# Patient Record
Sex: MALE | Race: WHITE | NOT HISPANIC OR LATINO | URBAN - NONMETROPOLITAN AREA
[De-identification: names, ages, dates, MRNs, and addresses within clinical notes are randomized per-mention and may not be internally consistent; named-entity substitution may affect disease eponyms.]

---

## 2024-06-02 PROBLEM — R31.9 HEMATURIA: Status: ACTIVE | Noted: 2024-06-02

## 2024-06-03 ENCOUNTER — TELEPHONE (OUTPATIENT)
Dept: UROLOGY | Facility: CLINIC | Age: 79
End: 2024-06-03

## 2024-06-03 RX ORDER — FINASTERIDE 5 MG/1
5 TABLET, FILM COATED ORAL
COMMUNITY

## 2024-06-03 RX ORDER — WARFARIN SODIUM 5 MG/1
5 TABLET ORAL
COMMUNITY

## 2024-06-03 RX ORDER — PANTOPRAZOLE SODIUM 20 MG/1
40 TABLET, DELAYED RELEASE ORAL
COMMUNITY

## 2024-06-03 RX ORDER — TRAVOPROST OPHTHALMIC SOLUTION 0.04 MG/ML
1 SOLUTION OPHTHALMIC
COMMUNITY

## 2024-06-03 NOTE — TELEPHONE ENCOUNTER
Pt name was incorrect in chart. Updated name. Pt unsure if he had recent psa testing done. Advised will call back if anything further is needed. Fax sent to [facility] medical records for last office note.  Call placed to PCP office for recent labs. Per their office PSA 2.1

## 2024-06-03 NOTE — PROGRESS NOTES
UROLOGY PROGRESS NOTE         NAME: Chris Francisco  AGE: 79 y.o. SEX: male  : 1945   MRN: [id number]    DATE: 2024  TIME: 1:23 PM    Assessment and Plan   Procedures     Impression:   1. Hematuria, unspecified type  2. Benign prostatic hyperplasia with lower urinary tract symptoms, symptom details unspecified  3. Bladder wall thickening  4. History of gross hematuria  5. Gross hematuria       Plan: For the gross hematuria recommend CT urogram, and cystoscopy with me in the office to complete his workup.  Also send his urine for culture and cytology today.    Continue finasteride for BPH.    Discussed the potential concerns with the history of gross hematuria including bladder cancer, stones, BPH related, and anticoagulant effects with Coumadin.      Chief Complaint     Chief Complaint   Patient presents with    Gross Hematuria    Benign Prostatic Hypertrophy     History of Present Illness     HPI: Chris Francisco is a 79 y.o. year old male who presents with hematuria.  Awaiting more notes in [facility].  Apparently saw patient while at [facility].    Per PCP note 2024 patient on finasteride.  Also on Coumadin.  Patient had abdominal ultrasound for left upper quadrant pain.  Noted left kidney was normal.  No mention of right kidney.  Urinalysis 24 was normal, PSA 2.1  Patient had CT urogram on  hematuria found to have enlarged prostate with bladder wall thickening.  Few months back patient had gross hematuria couple episodes.    Voiding well, average flow, some incomplete emptying occasional postvoid dribbling.  No urgency frequency.  No dysuria.  Does not smoke or drink alcohol.  Is on Coumadin.  No GI complaints is diabetic now but on no meds.    PVR today was 0.  Urinalysis and microscopic blood.    The following portions of the patient's history were reviewed and updated as appropriate: allergies, current medications, past family history, past medical history, past social history, past surgical  history and problem list.  Past Medical History:   Diagnosis Date    DVT (deep venous thrombosis) (HCC)     Enlarged prostate     GERD (gastroesophageal reflux disease)     Sleep apnea      Past Surgical History:   Procedure Laterality Date    HERNIA REPAIR      REPLACEMENT TOTAL KNEE      SHOULDER SURGERY       shoulder  Review of Systems     Const: Denies chills, fever and weight loss.  CV: Denies chest pain.  Resp: Denies SOB.  GI: Denies abdominal pain, nausea and vomiting.  : Denies symptoms other than stated above.  Musculo: Denies back pain.    Objective   /60   Pulse 70   Temp 98 °F (36.7 °C)   Wt 97.8 kg (215 lb 9.6 oz)   SpO2 98%     Physical Exam  Const: Appears healthy and well developed. No signs of acute distress present.  Resp: Respirations are regular and unlabored.   CV: Rate is regular. Rhythm is regular.  Abdomen: Abdomen is soft, nontender, and nondistended. Kidneys are not palpable.  : Normal external genital exam prostate large smooth no masses or nodules  Psych: Patient's attitude is cooperative. Mood is normal. Affect is normal.    Current Medications     Current Outpatient Medications:     Diclofenac Sodium (Voltaren) 1 %, Apply 2 g topically 4 (four) times a day, Disp: , Rfl:     finasteride (PROSCAR) 5 mg tablet, Take 5 mg by mouth, Disp: , Rfl:     pantoprazole (PROTONIX) 20 mg tablet, Take 40 mg by mouth, Disp: , Rfl:     travoprost (TRAVATAN-Z) 0.004 % ophthalmic solution, Apply 1 drop to eye, Disp: , Rfl:     warfarin (COUMADIN) 5 mg tablet, Take 5 mg by mouth, Disp: , Rfl:         Calixto Bhardwaj MD

## 2024-06-12 ENCOUNTER — APPOINTMENT (OUTPATIENT)
Dept: LAB | Facility: HOSPITAL | Age: 79
End: 2024-06-12
Payer: COMMERCIAL

## 2024-06-12 ENCOUNTER — OFFICE VISIT (OUTPATIENT)
Dept: UROLOGY | Facility: CLINIC | Age: 79
End: 2024-06-12
Payer: COMMERCIAL

## 2024-06-12 VITALS
WEIGHT: 215.6 LBS | DIASTOLIC BLOOD PRESSURE: 60 MMHG | HEART RATE: 70 BPM | TEMPERATURE: 98 F | OXYGEN SATURATION: 98 % | SYSTOLIC BLOOD PRESSURE: 112 MMHG

## 2024-06-12 DIAGNOSIS — Z87.898 HISTORY OF GROSS HEMATURIA: ICD-10-CM

## 2024-06-12 DIAGNOSIS — R31.0 GROSS HEMATURIA: ICD-10-CM

## 2024-06-12 DIAGNOSIS — N32.89 BLADDER WALL THICKENING: ICD-10-CM

## 2024-06-12 DIAGNOSIS — R31.9 HEMATURIA, UNSPECIFIED TYPE: Primary | ICD-10-CM

## 2024-06-12 DIAGNOSIS — N40.1 BENIGN PROSTATIC HYPERPLASIA WITH LOWER URINARY TRACT SYMPTOMS, SYMPTOM DETAILS UNSPECIFIED: ICD-10-CM

## 2024-06-12 LAB
ANION GAP SERPL CALCULATED.3IONS-SCNC: 5 MMOL/L (ref 4–13)
BUN SERPL-MCNC: 16 MG/DL (ref 5–25)
CALCIUM SERPL-MCNC: 8.7 MG/DL (ref 8.4–10.2)
CHLORIDE SERPL-SCNC: 108 MMOL/L (ref 96–108)
CO2 SERPL-SCNC: 26 MMOL/L (ref 21–32)
CREAT SERPL-MCNC: 1.01 MG/DL (ref 0.6–1.3)
GFR SERPL CREATININE-BSD FRML MDRD: 69 ML/MIN/1.73SQ M
GLUCOSE P FAST SERPL-MCNC: 89 MG/DL (ref 65–99)
POTASSIUM SERPL-SCNC: 4 MMOL/L (ref 3.5–5.3)
SL AMB  POCT GLUCOSE, UA: ABNORMAL
SL AMB LEUKOCYTE ESTERASE,UA: ABNORMAL
SL AMB POCT BILIRUBIN,UA: ABNORMAL
SL AMB POCT BLOOD,UA: ABNORMAL
SL AMB POCT CLARITY,UA: CLEAR
SL AMB POCT COLOR,UA: YELLOW
SL AMB POCT KETONES,UA: ABNORMAL
SL AMB POCT NITRITE,UA: ABNORMAL
SL AMB POCT PH,UA: 6
SL AMB POCT SPECIFIC GRAVITY,UA: 1.02
SL AMB POCT URINE PROTEIN: ABNORMAL
SL AMB POCT UROBILINOGEN: 0.2
SODIUM SERPL-SCNC: 139 MMOL/L (ref 135–147)

## 2024-06-12 PROCEDURE — 87086 URINE CULTURE/COLONY COUNT: CPT | Performed by: UROLOGY

## 2024-06-12 PROCEDURE — 80048 BASIC METABOLIC PNL TOTAL CA: CPT

## 2024-06-12 PROCEDURE — 36415 COLL VENOUS BLD VENIPUNCTURE: CPT

## 2024-06-12 PROCEDURE — 99204 OFFICE O/P NEW MOD 45 MIN: CPT | Performed by: UROLOGY

## 2024-06-12 PROCEDURE — 81003 URINALYSIS AUTO W/O SCOPE: CPT | Performed by: UROLOGY

## 2024-06-12 RX ORDER — PANTOPRAZOLE SODIUM 40 MG/1
40 TABLET, DELAYED RELEASE ORAL DAILY
COMMUNITY
Start: 2024-04-27 | End: 2024-06-12 | Stop reason: CLARIF

## 2024-06-14 LAB — BACTERIA UR CULT: NORMAL
